# Patient Record
Sex: FEMALE | Race: WHITE | NOT HISPANIC OR LATINO | Employment: FULL TIME | URBAN - METROPOLITAN AREA
[De-identification: names, ages, dates, MRNs, and addresses within clinical notes are randomized per-mention and may not be internally consistent; named-entity substitution may affect disease eponyms.]

---

## 2021-01-08 ENCOUNTER — APPOINTMENT (OUTPATIENT)
Dept: WOMENS IMAGING | Facility: HOSPITAL | Age: 31
End: 2021-01-08

## 2021-01-08 PROCEDURE — 77063 BREAST TOMOSYNTHESIS BI: CPT | Performed by: RADIOLOGY

## 2021-01-08 PROCEDURE — 77067 SCR MAMMO BI INCL CAD: CPT | Performed by: RADIOLOGY

## 2021-01-20 ENCOUNTER — OUTSIDE FACILITY SERVICE (OUTPATIENT)
Dept: CARDIOLOGY | Facility: CLINIC | Age: 31
End: 2021-01-20

## 2021-01-20 PROCEDURE — 93227 XTRNL ECG REC<48 HR R&I: CPT | Performed by: INTERNAL MEDICINE

## 2022-02-18 ENCOUNTER — APPOINTMENT (OUTPATIENT)
Dept: WOMENS IMAGING | Facility: HOSPITAL | Age: 32
End: 2022-02-18

## 2022-02-18 PROCEDURE — 77063 BREAST TOMOSYNTHESIS BI: CPT | Performed by: RADIOLOGY

## 2022-02-18 PROCEDURE — 77067 SCR MAMMO BI INCL CAD: CPT | Performed by: RADIOLOGY

## 2023-04-14 ENCOUNTER — APPOINTMENT (OUTPATIENT)
Dept: WOMENS IMAGING | Facility: HOSPITAL | Age: 33
End: 2023-04-14
Payer: COMMERCIAL

## 2023-04-14 PROCEDURE — A4648 IMPLANTABLE TISSUE MARKER: HCPCS | Performed by: RADIOLOGY

## 2023-04-14 PROCEDURE — 19084 BX BREAST ADD LESION US IMAG: CPT | Performed by: RADIOLOGY

## 2023-04-14 PROCEDURE — 19083 BX BREAST 1ST LESION US IMAG: CPT | Performed by: RADIOLOGY

## 2023-04-14 PROCEDURE — 19000 PUNCTURE ASPIR CYST BREAST: CPT | Performed by: RADIOLOGY

## 2025-03-26 ENCOUNTER — OFFICE VISIT (OUTPATIENT)
Dept: CARDIOLOGY | Facility: CLINIC | Age: 35
End: 2025-03-26
Payer: MEDICAID

## 2025-03-26 VITALS
BODY MASS INDEX: 39.99 KG/M2 | HEIGHT: 65 IN | SYSTOLIC BLOOD PRESSURE: 116 MMHG | WEIGHT: 240 LBS | DIASTOLIC BLOOD PRESSURE: 82 MMHG | HEART RATE: 98 BPM

## 2025-03-26 DIAGNOSIS — R00.2 PALPITATIONS: ICD-10-CM

## 2025-03-26 DIAGNOSIS — R00.0 TACHYCARDIA: ICD-10-CM

## 2025-03-26 DIAGNOSIS — G47.30 SLEEP APNEA IN ADULT: Primary | ICD-10-CM

## 2025-03-26 DIAGNOSIS — F17.290 VAPING NICOTINE DEPENDENCE, TOBACCO PRODUCT: ICD-10-CM

## 2025-03-26 DIAGNOSIS — E88.810 METABOLIC SYNDROME: ICD-10-CM

## 2025-03-26 DIAGNOSIS — E78.00 HYPERCHOLESTEROLEMIA: ICD-10-CM

## 2025-03-26 DIAGNOSIS — R73.9 HYPERGLYCEMIA: ICD-10-CM

## 2025-03-26 PROCEDURE — 1160F RVW MEDS BY RX/DR IN RCRD: CPT | Performed by: INTERNAL MEDICINE

## 2025-03-26 PROCEDURE — 99244 OFF/OP CNSLTJ NEW/EST MOD 40: CPT | Performed by: INTERNAL MEDICINE

## 2025-03-26 PROCEDURE — 1159F MED LIST DOCD IN RCRD: CPT | Performed by: INTERNAL MEDICINE

## 2025-03-26 PROCEDURE — 93000 ELECTROCARDIOGRAM COMPLETE: CPT | Performed by: INTERNAL MEDICINE

## 2025-03-26 RX ORDER — NORETHINDRONE ACETATE AND ETHINYL ESTRADIOL, ETHINYL ESTRADIOL AND FERROUS FUMARATE 1MG-10(24)
1 KIT ORAL DAILY
COMMUNITY
Start: 2025-03-10

## 2025-03-26 RX ORDER — PAROXETINE 10 MG/1
10 TABLET, FILM COATED ORAL DAILY
COMMUNITY

## 2025-03-26 RX ORDER — OMEPRAZOLE 40 MG/1
CAPSULE, DELAYED RELEASE ORAL
COMMUNITY

## 2025-03-26 RX ORDER — NEBIVOLOL 2.5 MG/1
2.5 TABLET ORAL DAILY
COMMUNITY
End: 2025-03-26

## 2025-03-26 RX ORDER — CETIRIZINE HYDROCHLORIDE 10 MG/1
10 TABLET ORAL DAILY
COMMUNITY

## 2025-03-26 RX ORDER — PHENTERMINE HYDROCHLORIDE 37.5 MG/1
37.5 TABLET ORAL
COMMUNITY

## 2025-03-26 RX ORDER — BISOPROLOL FUMARATE 5 MG/1
5 TABLET, FILM COATED ORAL DAILY
Qty: 30 TABLET | Refills: 6 | Status: SHIPPED | OUTPATIENT
Start: 2025-03-26

## 2025-03-26 RX ORDER — ATORVASTATIN CALCIUM 20 MG/1
1 TABLET, FILM COATED ORAL DAILY
COMMUNITY

## 2025-03-26 NOTE — PROGRESS NOTES
Chief Complaint   Patient presents with   • Establish Care     Pcp referred. Tachycardia, palpitations, symptomatic hypotension   • Palpitations     Has had for years, somewhat controlled with metoprolol. Symptoms worsened for the past couple months, PCP recently changed to Bystolic, no real difference in symptoms.    • Hypotension     Consistently runs around 110/70. Will drop at times to 90's-100's/ 60-70's, worse at times when she stands up, vision becomes black. Denies any syncope.  PCP checked orthostatic BP's, pt reports they were normal.   • Lab     Most recent labs from Oct in chart. TSH 0.34    • Weight Loss     Has lost over 30lbs over the past year.    • cardiac history     Wore monitor in 2021, report in chart.         CARDIAC COMPLAINTS  Dizziness and palpitations      Subjective   Osiris Petersen is a 34 y.o. female came in today for her initial cardiac evaluation.  She has history of palpitation in the form of tachycardia on and off for many years.  She also has history of dizziness, lightheadedness and also become hypotensive.  She had a Holter monitor placed about 4 years ago which was unremarkable.  She was put on low-dose of beta-blockers and has done fairly well for many years.  In the last few months the symptoms got more frequent and longer lasting.  The metoprolol was changed to Bystolic but no significant difference noted in the symptoms.  She also has been noticing slightly low blood pressure and it is associated with dizziness.  The symptoms do get worse when she suddenly stands up.  Her vision becomes dark.  Apparently she had a orthostatic blood pressure checked at the office and was told everything was normal.  She also has been taking Adipex for the last few months and has lost about 30 pounds.  She had labs done in October and most of them are normal except the TSH which was within the lower limit of normal.  Her blood count was normal her electrolytes, renal function liver function  normal.  Her cholesterol is 193 but the LDL was 121.  She also has history of sleep apnea for which she uses CPAP regularly.  She used to smoke in the past but quit in .  She unfortunately tests vaping.  Her mother had history of A-fib and she also had breast cancer.  Her father has multiple sclerosis.    Past Surgical History:   Procedure Laterality Date   • CONVERTED (HISTORICAL) HOLTER  2021    @ [facility]. 1 day. Avg 95. . Rare PAC & PVC       Current Outpatient Medications   Medication Sig Dispense Refill   • atorvastatin (LIPITOR) 20 MG tablet Take 1 tablet by mouth Daily.     • cetirizine (zyrTEC) 10 MG tablet Take 1 tablet by mouth Daily.     • Lo Loestrin Fe 1 MG-10 MCG / 10 MCG tablet Take 1 tablet by mouth Daily.     • omeprazole (priLOSEC) 40 MG capsule TAKE 1 CAPSULE BY MOUTH EVERY DAY 30 MINUTES BEFORE BREAKFAST     • PARoxetine (PAXIL) 10 MG tablet Take 1 tablet by mouth Daily.     • phentermine (ADIPEX-P) 37.5 MG tablet Take 1 tablet by mouth Every Morning Before Breakfast.     • bisoprolol (ZEBeta) 5 MG tablet Take 1 tablet by mouth Daily. 30 tablet 6     No current facility-administered medications for this visit.           ALLERGIES:  Patient has no known allergies.    Past Medical History:   Diagnosis Date   • History of cholecystectomy    • Hyperlipidemia    • Seasonal allergies    • Sleep apnea     compliant CPAP user       Social History     Tobacco Use   Smoking Status Former   • Current packs/day: 0.00   • Types: Cigarettes   • Start date:    • Quit date: 7/15/2023   • Years since quittin.6   Smokeless Tobacco Never          Family History   Problem Relation Age of Onset   • Breast cancer Mother    • Atrial fibrillation Mother    • Multiple sclerosis Father    • Heart attack Maternal Grandmother          at 56 from MI   • Stroke Maternal Grandfather    • Cancer Maternal Grandfather    • No Known Problems Paternal Grandmother    • Valvular heart disease Paternal  "Grandfather         s/p MV surgery       Review of Systems   Constitutional: Negative for decreased appetite and malaise/fatigue.   HENT:  Negative for congestion and sore throat.    Eyes:  Negative for blurred vision, double vision and visual disturbance.   Cardiovascular:  Positive for near-syncope and palpitations. Negative for chest pain.   Respiratory:  Negative for shortness of breath and snoring.    Endocrine: Negative for cold intolerance and heat intolerance.   Hematologic/Lymphatic: Negative for adenopathy. Does not bruise/bleed easily.   Skin:  Negative for itching, nail changes and skin cancer.   Musculoskeletal:  Negative for arthritis and myalgias.   Gastrointestinal:  Negative for abdominal pain, dysphagia and heartburn.   Genitourinary:  Negative for bladder incontinence and frequency.   Neurological:  Positive for dizziness. Negative for seizures and vertigo.   Psychiatric/Behavioral:  Negative for altered mental status.    Allergic/Immunologic: Negative for environmental allergies and hives.     Diabetes- No  Thyroid- normal    Objective     /82 (BP Location: Right arm)   Pulse 98   Ht 165.1 cm (65\")   Wt 109 kg (240 lb)   BMI 39.94 kg/m²     Vitals and nursing note reviewed.   Constitutional:       Appearance: Healthy appearance. Not in distress.   Eyes:      Conjunctiva/sclera: Conjunctivae normal.      Pupils: Pupils are equal, round, and reactive to light.   HENT:      Head: Normocephalic.   Pulmonary:      Effort: Pulmonary effort is normal.      Breath sounds: Normal breath sounds.   Cardiovascular:      PMI at left midclavicular line. Normal rate. Regular rhythm.   Abdominal:      General: Bowel sounds are normal.      Palpations: Abdomen is soft.   Musculoskeletal: Normal range of motion.      Cervical back: Normal range of motion and neck supple. Skin:     General: Skin is warm and dry.   Neurological:      Mental Status: Alert, oriented to person, place, and time and oriented to " person, place and time.       ECG 12 Lead    Date/Time: 3/26/2025 1:18 PM  Performed by: Celena Morelos MD    Authorized by: Celena Morelos MD  Previous ECG: no previous ECG available  Rhythm: sinus rhythm  Rate: normal  Conduction: incomplete right bundle branch block  Conduction comments: Short SD interval  QRS axis: normal  Other findings: T wave abnormality    Clinical impression: non-specific ECG        @ASSESSMENT/PLAN@        Diagnoses and all orders for this visit:    1. Sleep apnea in adult (Primary)    2. Palpitations  -     bisoprolol (ZEBeta) 5 MG tablet; Take 1 tablet by mouth Daily.  Dispense: 30 tablet; Refill: 6  -     Holter Monitor - 72 Hour Up To 15 Days; Future  -     Comprehensive Metabolic Panel; Future  -     TSH; Future  -     T4, Free; Future  -     Magnesium; Future  -     Adult Transthoracic Echo Complete W/ Cont if Necessary Per Protocol; Future    3. Tachycardia  -     bisoprolol (ZEBeta) 5 MG tablet; Take 1 tablet by mouth Daily.  Dispense: 30 tablet; Refill: 6  -     Holter Monitor - 72 Hour Up To 15 Days; Future  -     Adult Transthoracic Echo Complete W/ Cont if Necessary Per Protocol; Future    4. Hypercholesterolemia  -     Lipid Panel; Future    5. Vaping nicotine dependence, tobacco product  -     CBC & Differential; Future    6. Metabolic syndrome  -     Adult Transthoracic Echo Complete W/ Cont if Necessary Per Protocol; Future    7. Hyperglycemia  -     Hemoglobin A1c; Future    At baseline her heart rate is upper limit of normal.  Her blood pressure is lower limit of normal.  Her EKG shows sinus rhythm with incomplete right bundle branch block, short SD interval.  Her clinical examination reveals a BMI of close to 40.  Her cardiovascular examination is unremarkable.    She has sleep apnea and apparently has been using the CPAP regularly.  Continue to monitor and I talked to her about things to help her reducing the weight which includes bariatric  surgery.    Regarding the palpitation, I advised her to change Bystolic to bisoprolol.  If she start developing any dizziness with 5 mg of bisoprolol then we can add Florinef.  Meanwhile I advised her to wear another Holter monitor to see what other arrhythmia she is getting.  She is also advised to check the electrolytes, renal function and thyroid function test.  She need an echocardiogram to look for LV function, valvular structures and the PA pressure    Regarding the tachycardia, she has been cutting down on the caffeine.  She does take Adipex which can increase the heart rate but apparently she had the symptoms even before it.  Will review the Holter monitor and echocardiogram when it is available    Regarding her elevated cholesterol, I had a long talk with her about diet.  I talked to her about cutting down on the animal-based protein and increasing the plant-based protein.  I to recheck the lipid profile in 6 months    Regarding her vaping, talked to her about the increased risk of developing permanent pulmonary damage.  I did give her papers to help her quit    Regarding her blood sugar being borderline elevated, like to check the A1c level    Based on the results, further recommendations will be made.               Electronically signed by Celena Morelos MD March 26, 2025 13:16 EDT

## 2025-03-26 NOTE — LETTER
March 26, 2025     KARTHIK Toure  [address] [place] [address]    Patient: Osiris Petersen   YOB: 1990   Date of Visit: 3/26/2025     Dear KARTHIK Toure:       Thank you for referring Osiris Petersen to me for evaluation. Below are the relevant portions of my assessment and plan of care.    If you have questions, please do not hesitate to call me. I look forward to following Osiris along with you.         Sincerely,        Celena Morelos MD        CC: No Recipients    Celena Morelos MD  03/26/25 1319  Sign when Signing Visit  Chief Complaint   Patient presents with   • Establish Care     Pcp referred. Tachycardia, palpitations, symptomatic hypotension   • Palpitations     Has had for years, somewhat controlled with metoprolol. Symptoms worsened for the past couple months, PCP recently changed to Bystolic, no real difference in symptoms.    • Hypotension     Consistently runs around 110/70. Will drop at times to 90's-100's/ 60-70's, worse at times when she stands up, vision becomes black. Denies any syncope.  PCP checked orthostatic BP's, pt reports they were normal.   • Lab     Most recent labs from Oct in chart. TSH 0.34    • Weight Loss     Has lost over 30lbs over the past year.    • cardiac history     Wore monitor in 2021, report in chart.         CARDIAC COMPLAINTS  Dizziness and palpitations      Subjective  Osiris Petersen is a 34 y.o. female came in today for her initial cardiac evaluation.  She has history of palpitation in the form of tachycardia on and off for many years.  She also has history of dizziness, lightheadedness and also become hypotensive.  She had a Holter monitor placed about 4 years ago which was unremarkable.  She was put on low-dose of beta-blockers and has done fairly well for many years.  In the last few months the symptoms got more frequent and longer lasting.  The metoprolol was changed to Bystolic but no significant difference  noted in the symptoms.  She also has been noticing slightly low blood pressure and it is associated with dizziness.  The symptoms do get worse when she suddenly stands up.  Her vision becomes dark.  Apparently she had a orthostatic blood pressure checked at the office and was told everything was normal.  She also has been taking Adipex for the last few months and has lost about 30 pounds.  She had labs done in October and most of them are normal except the TSH which was within the lower limit of normal.  Her blood count was normal her electrolytes, renal function liver function normal.  Her cholesterol is 193 but the LDL was 121.  She also has history of sleep apnea for which she uses CPAP regularly.  She used to smoke in the past but quit in 2023.  She unfortunately tests vaping.  Her mother had history of A-fib and she also had breast cancer.  Her father has multiple sclerosis.    Past Surgical History:   Procedure Laterality Date   • CONVERTED (HISTORICAL) HOLTER  01/20/2021    @ [facility]. 1 day. Avg 95. . Rare PAC & PVC       Current Outpatient Medications   Medication Sig Dispense Refill   • atorvastatin (LIPITOR) 20 MG tablet Take 1 tablet by mouth Daily.     • cetirizine (zyrTEC) 10 MG tablet Take 1 tablet by mouth Daily.     • Lo Loestrin Fe 1 MG-10 MCG / 10 MCG tablet Take 1 tablet by mouth Daily.     • omeprazole (priLOSEC) 40 MG capsule TAKE 1 CAPSULE BY MOUTH EVERY DAY 30 MINUTES BEFORE BREAKFAST     • PARoxetine (PAXIL) 10 MG tablet Take 1 tablet by mouth Daily.     • phentermine (ADIPEX-P) 37.5 MG tablet Take 1 tablet by mouth Every Morning Before Breakfast.     • bisoprolol (ZEBeta) 5 MG tablet Take 1 tablet by mouth Daily. 30 tablet 6     No current facility-administered medications for this visit.           ALLERGIES:  Patient has no known allergies.    Past Medical History:   Diagnosis Date   • History of cholecystectomy    • Hyperlipidemia    • Seasonal allergies    • Sleep apnea     compliant  "CPAP user       Social History     Tobacco Use   Smoking Status Former   • Current packs/day: 0.00   • Types: Cigarettes   • Start date:    • Quit date: 7/15/2023   • Years since quittin.6   Smokeless Tobacco Never          Family History   Problem Relation Age of Onset   • Breast cancer Mother    • Atrial fibrillation Mother    • Multiple sclerosis Father    • Heart attack Maternal Grandmother          at 56 from MI   • Stroke Maternal Grandfather    • Cancer Maternal Grandfather    • No Known Problems Paternal Grandmother    • Valvular heart disease Paternal Grandfather         s/p MV surgery       Review of Systems   Constitutional: Negative for decreased appetite and malaise/fatigue.   HENT:  Negative for congestion and sore throat.    Eyes:  Negative for blurred vision, double vision and visual disturbance.   Cardiovascular:  Positive for near-syncope and palpitations. Negative for chest pain.   Respiratory:  Negative for shortness of breath and snoring.    Endocrine: Negative for cold intolerance and heat intolerance.   Hematologic/Lymphatic: Negative for adenopathy. Does not bruise/bleed easily.   Skin:  Negative for itching, nail changes and skin cancer.   Musculoskeletal:  Negative for arthritis and myalgias.   Gastrointestinal:  Negative for abdominal pain, dysphagia and heartburn.   Genitourinary:  Negative for bladder incontinence and frequency.   Neurological:  Positive for dizziness. Negative for seizures and vertigo.   Psychiatric/Behavioral:  Negative for altered mental status.    Allergic/Immunologic: Negative for environmental allergies and hives.     Diabetes- No  Thyroid- normal    Objective    /82 (BP Location: Right arm)   Pulse 98   Ht 165.1 cm (65\")   Wt 109 kg (240 lb)   BMI 39.94 kg/m²     Vitals and nursing note reviewed.   Constitutional:       Appearance: Healthy appearance. Not in distress.   Eyes:      Conjunctiva/sclera: Conjunctivae normal.      Pupils: Pupils " are equal, round, and reactive to light.   HENT:      Head: Normocephalic.   Pulmonary:      Effort: Pulmonary effort is normal.      Breath sounds: Normal breath sounds.   Cardiovascular:      PMI at left midclavicular line. Normal rate. Regular rhythm.   Abdominal:      General: Bowel sounds are normal.      Palpations: Abdomen is soft.   Musculoskeletal: Normal range of motion.      Cervical back: Normal range of motion and neck supple. Skin:     General: Skin is warm and dry.   Neurological:      Mental Status: Alert, oriented to person, place, and time and oriented to person, place and time.       ECG 12 Lead    Date/Time: 3/26/2025 1:18 PM  Performed by: Celena Morelos MD    Authorized by: Celena Morelos MD  Previous ECG: no previous ECG available  Rhythm: sinus rhythm  Rate: normal  Conduction: incomplete right bundle branch block  Conduction comments: Short VT interval  QRS axis: normal  Other findings: T wave abnormality    Clinical impression: non-specific ECG        @ASSESSMENT/PLAN@        Diagnoses and all orders for this visit:    1. Sleep apnea in adult (Primary)    2. Palpitations  -     bisoprolol (ZEBeta) 5 MG tablet; Take 1 tablet by mouth Daily.  Dispense: 30 tablet; Refill: 6  -     Holter Monitor - 72 Hour Up To 15 Days; Future  -     Comprehensive Metabolic Panel; Future  -     TSH; Future  -     T4, Free; Future  -     Magnesium; Future  -     Adult Transthoracic Echo Complete W/ Cont if Necessary Per Protocol; Future    3. Tachycardia  -     bisoprolol (ZEBeta) 5 MG tablet; Take 1 tablet by mouth Daily.  Dispense: 30 tablet; Refill: 6  -     Holter Monitor - 72 Hour Up To 15 Days; Future  -     Adult Transthoracic Echo Complete W/ Cont if Necessary Per Protocol; Future    4. Hypercholesterolemia  -     Lipid Panel; Future    5. Vaping nicotine dependence, tobacco product  -     CBC & Differential; Future    6. Metabolic syndrome  -     Adult Transthoracic Echo Complete W/ Cont if  Necessary Per Protocol; Future    7. Hyperglycemia  -     Hemoglobin A1c; Future    At baseline her heart rate is upper limit of normal.  Her blood pressure is lower limit of normal.  Her EKG shows sinus rhythm with incomplete right bundle branch block, short VT interval.  Her clinical examination reveals a BMI of close to 40.  Her cardiovascular examination is unremarkable.    She has sleep apnea and apparently has been using the CPAP regularly.  Continue to monitor and I talked to her about things to help her reducing the weight which includes bariatric surgery.    Regarding the palpitation, I advised her to change Bystolic to bisoprolol.  If she start developing any dizziness with 5 mg of bisoprolol then we can add Florinef.  Meanwhile I advised her to wear another Holter monitor to see what other arrhythmia she is getting.  She is also advised to check the electrolytes, renal function and thyroid function test.  She need an echocardiogram to look for LV function, valvular structures and the PA pressure    Regarding the tachycardia, she has been cutting down on the caffeine.  She does take Adipex which can increase the heart rate but apparently she had the symptoms even before it.  Will review the Holter monitor and echocardiogram when it is available    Regarding her elevated cholesterol, I had a long talk with her about diet.  I talked to her about cutting down on the animal-based protein and increasing the plant-based protein.  I to recheck the lipid profile in 6 months    Regarding her vaping, talked to her about the increased risk of developing permanent pulmonary damage.  I did give her papers to help her quit    Regarding her blood sugar being borderline elevated, like to check the A1c level    Based on the results, further recommendations will be made.               Electronically signed by Celena Morelos MD March 26, 2025 13:16 EDT

## 2025-03-28 ENCOUNTER — PATIENT ROUNDING (BHMG ONLY) (OUTPATIENT)
Dept: CARDIOLOGY | Facility: CLINIC | Age: 35
End: 2025-03-28
Payer: MEDICAID

## 2025-03-28 NOTE — PROGRESS NOTES
March 28, 2025    Hello, may I speak with Osiris Petersen?    My name is Raissa at Dr. Morelos's office.       I am  with [facility]  [address] [place] [address] [phone].    Before we get started may I verify your date of birth? 1990    I am calling to officially welcome you to our practice and ask about your recent visit. Is this a good time to talk? yes    Tell me about your visit with us. What things went well?  Everything went really good from start to finish.  I was welcomed by everyone I talked to.  For coming to the doctor this was a really good experience. And I felt taken care of.      We're always looking for ways to make our patients' experiences even better. Do you have recommendations on ways we may improve?  no    Overall were you satisfied with your first visit to our practice? yes       I appreciate you taking the time to speak with me today. Is there anything else I can do for you? no      Thank you, and have a great day.

## 2025-04-02 ENCOUNTER — HOSPITAL ENCOUNTER (OUTPATIENT)
Dept: CARDIOLOGY | Facility: HOSPITAL | Age: 35
Discharge: HOME | End: 2025-04-02
Payer: MEDICAID

## 2025-04-02 ENCOUNTER — LAB (OUTPATIENT)
Dept: LAB | Facility: HOSPITAL | Age: 35
End: 2025-04-02
Payer: MEDICAID

## 2025-04-02 DIAGNOSIS — R00.2 PALPITATIONS: ICD-10-CM

## 2025-04-02 DIAGNOSIS — R00.0 TACHYCARDIA: ICD-10-CM

## 2025-04-02 DIAGNOSIS — R73.9 HYPERGLYCEMIA: ICD-10-CM

## 2025-04-02 DIAGNOSIS — E78.00 HYPERCHOLESTEROLEMIA: ICD-10-CM

## 2025-04-02 DIAGNOSIS — F17.290 VAPING NICOTINE DEPENDENCE, TOBACCO PRODUCT: ICD-10-CM

## 2025-04-02 DIAGNOSIS — E88.810 METABOLIC SYNDROME: ICD-10-CM

## 2025-04-02 LAB
ALBUMIN SERPL-MCNC: 3.4 G/DL (ref 3.5–5.2)
ALBUMIN/GLOB SERPL: 1.1 G/DL
ALP SERPL-CCNC: 91 U/L (ref 39–117)
ALT SERPL W P-5'-P-CCNC: 34 U/L (ref 1–33)
ANION GAP SERPL CALCULATED.3IONS-SCNC: 6.8 MMOL/L (ref 5–15)
AST SERPL-CCNC: 32 U/L (ref 1–32)
AV MEAN PRESS GRAD SYS DOP V1V2: 4.1 MMHG
AV VMAX SYS DOP: 131.7 CM/SEC
BASOPHILS # BLD AUTO: 0.04 10*3/MM3 (ref 0–0.2)
BASOPHILS NFR BLD AUTO: 0.4 % (ref 0–1.5)
BH CV ECHO MEAS - ACS: 2.07 CM
BH CV ECHO MEAS - AO MAX PG: 6.9 MMHG
BH CV ECHO MEAS - AO ROOT DIAM: 2.9 CM
BH CV ECHO MEAS - AO V2 VTI: 28.8 CM
BH CV ECHO MEAS - EDV(CUBED): 68.4 ML
BH CV ECHO MEAS - EDV(MOD-SP4): 102 ML
BH CV ECHO MEAS - EF(MOD-SP4): 50.2 %
BH CV ECHO MEAS - ESV(CUBED): 17.6 ML
BH CV ECHO MEAS - ESV(MOD-SP4): 50.8 ML
BH CV ECHO MEAS - FS: 36.4 %
BH CV ECHO MEAS - IVS/LVPW: 0.83 CM
BH CV ECHO MEAS - IVSD: 0.92 CM
BH CV ECHO MEAS - LA DIMENSION: 3.5 CM
BH CV ECHO MEAS - LAT PEAK E' VEL: 14.5 CM/SEC
BH CV ECHO MEAS - LV DIASTOLIC VOL/BSA (35-75): 47.7 CM2
BH CV ECHO MEAS - LV MASS(C)D: 134.1 GRAMS
BH CV ECHO MEAS - LV SYSTOLIC VOL/BSA (12-30): 23.8 CM2
BH CV ECHO MEAS - LVIDD: 4.1 CM
BH CV ECHO MEAS - LVIDS: 2.6 CM
BH CV ECHO MEAS - LVPWD: 1.11 CM
BH CV ECHO MEAS - MED PEAK E' VEL: 11.2 CM/SEC
BH CV ECHO MEAS - MV A MAX VEL: 60.4 CM/SEC
BH CV ECHO MEAS - MV DEC TIME: 0.38 SEC
BH CV ECHO MEAS - MV E MAX VEL: 69 CM/SEC
BH CV ECHO MEAS - MV E/A: 1.14
BH CV ECHO MEAS - RVDD: 2.9 CM
BH CV ECHO MEAS - SV(MOD-SP4): 51.2 ML
BH CV ECHO MEAS - SVI(MOD-SP4): 24 ML/M2
BH CV ECHO MEASUREMENTS AVERAGE E/E' RATIO: 5.37
BILIRUB SERPL-MCNC: 0.2 MG/DL (ref 0–1.2)
BUN SERPL-MCNC: 9 MG/DL (ref 6–20)
BUN/CREAT SERPL: 12.7 (ref 7–25)
CALCIUM SPEC-SCNC: 8.9 MG/DL (ref 8.6–10.5)
CHLORIDE SERPL-SCNC: 105 MMOL/L (ref 98–107)
CHOLEST SERPL-MCNC: 117 MG/DL (ref 0–200)
CO2 SERPL-SCNC: 28.2 MMOL/L (ref 22–29)
CREAT SERPL-MCNC: 0.71 MG/DL (ref 0.57–1)
DEPRECATED RDW RBC AUTO: 46.5 FL (ref 37–54)
EGFRCR SERPLBLD CKD-EPI 2021: 113.9 ML/MIN/1.73
EOSINOPHIL # BLD AUTO: 0.35 10*3/MM3 (ref 0–0.4)
EOSINOPHIL NFR BLD AUTO: 3.7 % (ref 0.3–6.2)
ERYTHROCYTE [DISTWIDTH] IN BLOOD BY AUTOMATED COUNT: 13.7 % (ref 12.3–15.4)
GLOBULIN UR ELPH-MCNC: 3.1 GM/DL
GLUCOSE SERPL-MCNC: 92 MG/DL (ref 65–99)
HBA1C MFR BLD: 5.4 % (ref 4.8–5.6)
HCT VFR BLD AUTO: 41.5 % (ref 34–46.6)
HDLC SERPL-MCNC: 30 MG/DL (ref 40–60)
HGB BLD-MCNC: 13.1 G/DL (ref 12–15.9)
IMM GRANULOCYTES # BLD AUTO: 0.13 10*3/MM3 (ref 0–0.05)
IMM GRANULOCYTES NFR BLD AUTO: 1.4 % (ref 0–0.5)
IVRT: 99 MS
LDLC SERPL CALC-MCNC: 68 MG/DL (ref 0–100)
LDLC/HDLC SERPL: 2.25 {RATIO}
LEFT ATRIUM VOLUME INDEX: 17.2 ML/M2
LV EF 3D SEGMENTATION: 58 %
LYMPHOCYTES # BLD AUTO: 2.35 10*3/MM3 (ref 0.7–3.1)
LYMPHOCYTES NFR BLD AUTO: 24.7 % (ref 19.6–45.3)
MAGNESIUM SERPL-MCNC: 1.6 MG/DL (ref 1.6–2.6)
MCH RBC QN AUTO: 29 PG (ref 26.6–33)
MCHC RBC AUTO-ENTMCNC: 31.6 G/DL (ref 31.5–35.7)
MCV RBC AUTO: 91.8 FL (ref 79–97)
MONOCYTES # BLD AUTO: 0.66 10*3/MM3 (ref 0.1–0.9)
MONOCYTES NFR BLD AUTO: 6.9 % (ref 5–12)
NEUTROPHILS NFR BLD AUTO: 6 10*3/MM3 (ref 1.7–7)
NEUTROPHILS NFR BLD AUTO: 62.9 % (ref 42.7–76)
NRBC BLD AUTO-RTO: 0 /100 WBC (ref 0–0.2)
PLATELET # BLD AUTO: 367 10*3/MM3 (ref 140–450)
PMV BLD AUTO: 9.8 FL (ref 6–12)
POTASSIUM SERPL-SCNC: 3.8 MMOL/L (ref 3.5–5.2)
PROT SERPL-MCNC: 6.5 G/DL (ref 6–8.5)
RBC # BLD AUTO: 4.52 10*6/MM3 (ref 3.77–5.28)
SODIUM SERPL-SCNC: 140 MMOL/L (ref 136–145)
T4 FREE SERPL-MCNC: 1.22 NG/DL (ref 0.92–1.68)
TRIGL SERPL-MCNC: 98 MG/DL (ref 0–150)
TSH SERPL DL<=0.05 MIU/L-ACNC: 0.72 UIU/ML (ref 0.27–4.2)
VLDLC SERPL-MCNC: 19 MG/DL (ref 5–40)
WBC NRBC COR # BLD AUTO: 9.53 10*3/MM3 (ref 3.4–10.8)

## 2025-04-02 PROCEDURE — 80061 LIPID PANEL: CPT

## 2025-04-02 PROCEDURE — 93306 TTE W/DOPPLER COMPLETE: CPT

## 2025-04-02 PROCEDURE — 84443 ASSAY THYROID STIM HORMONE: CPT

## 2025-04-02 PROCEDURE — 85025 COMPLETE CBC W/AUTO DIFF WBC: CPT

## 2025-04-02 PROCEDURE — 84439 ASSAY OF FREE THYROXINE: CPT

## 2025-04-02 PROCEDURE — 83036 HEMOGLOBIN GLYCOSYLATED A1C: CPT

## 2025-04-02 PROCEDURE — 36415 COLL VENOUS BLD VENIPUNCTURE: CPT

## 2025-04-02 PROCEDURE — 80053 COMPREHEN METABOLIC PANEL: CPT

## 2025-04-02 PROCEDURE — 83735 ASSAY OF MAGNESIUM: CPT
